# Patient Record
Sex: FEMALE | Race: WHITE
[De-identification: names, ages, dates, MRNs, and addresses within clinical notes are randomized per-mention and may not be internally consistent; named-entity substitution may affect disease eponyms.]

---

## 2019-12-16 ENCOUNTER — HOSPITAL ENCOUNTER (EMERGENCY)
Dept: HOSPITAL 1 - ED | Age: 59
LOS: 1 days | Discharge: ANOTHER HOSPITAL | End: 2019-12-17
Payer: COMMERCIAL

## 2019-12-16 VITALS
WEIGHT: 130 LBS | HEIGHT: 66 IN | HEIGHT: 66 IN | BODY MASS INDEX: 20.89 KG/M2 | BODY MASS INDEX: 20.89 KG/M2 | WEIGHT: 130 LBS

## 2019-12-16 DIAGNOSIS — F79: Primary | ICD-10-CM

## 2019-12-16 DIAGNOSIS — F06.2: ICD-10-CM

## 2019-12-16 DIAGNOSIS — I10: ICD-10-CM

## 2019-12-16 DIAGNOSIS — F31.9: ICD-10-CM

## 2019-12-16 DIAGNOSIS — G40.909: ICD-10-CM

## 2019-12-16 DIAGNOSIS — F20.9: ICD-10-CM

## 2019-12-16 LAB
ALBUMIN SERPL-MCNC: 3.4 G/DL (ref 3.4–5)
ALP SERPL-CCNC: 132 U/L (ref 46–116)
ALT SERPL-CCNC: 38 U/L (ref 14–59)
AST SERPL-CCNC: 28 U/L (ref 15–37)
BASOPHILS NFR BLD: 0.5 % (ref 0–2)
BILIRUB SERPL-MCNC: 0.24 MG/DL (ref 0.2–1)
BUN SERPL-MCNC: 19 MG/DL (ref 7–18)
CALCIUM SERPL-MCNC: 8.5 MG/DL (ref 8.5–10.1)
CHLORIDE SERPL-SCNC: 103 MMOL/L (ref 98–107)
CO2 SERPL-SCNC: 28 MMOL/L (ref 21–32)
CREAT SERPL-MCNC: 0.8 MG/DL (ref 0.6–1)
ERYTHROCYTE [DISTWIDTH] IN BLOOD BY AUTOMATED COUNT: 15 % (ref 11.5–14.5)
GFR SERPLBLD BASED ON 1.73 SQ M-ARVRAT: > 60 ML/MIN
GLUCOSE SERPL-MCNC: 103 MG/DL (ref 74–106)
LIPASE SERPL-CCNC: 158 IU/L (ref 73–393)
PLATELET # BLD: 261 X10^3MCL (ref 130–400)
POTASSIUM SERPL-SCNC: 4.7 MMOL/L (ref 3.5–5.1)
PROT SERPL-MCNC: 7 G/DL (ref 6.4–8.2)
SODIUM SERPL-SCNC: 139 MMOL/L (ref 136–145)

## 2019-12-16 PROCEDURE — G0480 DRUG TEST DEF 1-7 CLASSES: HCPCS

## 2019-12-17 VITALS — SYSTOLIC BLOOD PRESSURE: 99 MMHG | DIASTOLIC BLOOD PRESSURE: 65 MMHG

## 2019-12-17 LAB — AMPHETAMINES UR QL SCN: (no result)

## 2019-12-17 NOTE — NUR
REceived intake paperwork from facility.  Information has been sent to the
following facilities for bed placement review.
[facility]/ [facility]/ [facility]/ [facility]/ [facility]/ [facility]/ [facility]/ [facility]
Will keep facility informed of any progress with placement.